# Patient Record
Sex: FEMALE | Race: WHITE | NOT HISPANIC OR LATINO | Employment: UNEMPLOYED | URBAN - METROPOLITAN AREA
[De-identification: names, ages, dates, MRNs, and addresses within clinical notes are randomized per-mention and may not be internally consistent; named-entity substitution may affect disease eponyms.]

---

## 2017-08-15 PROBLEM — T85.44XA BREAST IMPLANT CAPSULAR CONTRACTURE: Status: ACTIVE | Noted: 2017-08-15

## 2017-09-13 PROBLEM — K42.9 UMBILICAL HERNIA WITHOUT OBSTRUCTION AND WITHOUT GANGRENE: Status: ACTIVE | Noted: 2017-09-13

## 2020-11-09 PROBLEM — Z80.3 FAMILY HISTORY OF MALIGNANT NEOPLASM OF BREAST: Status: ACTIVE | Noted: 2020-11-09

## 2020-11-19 ENCOUNTER — TELEPHONE (OUTPATIENT)
Dept: OBSTETRICS AND GYNECOLOGY | Facility: CLINIC | Age: 50
End: 2020-11-19

## 2020-11-19 NOTE — TELEPHONE ENCOUNTER
----- Message from Kassie Myrick sent at 11/19/2020  9:41 AM CST -----  Regarding: patient advice  Contact: patient  Patient would like to know if all of her records was received from her previous doctor prior to her appointment on 12/1/20. Please call back at [phone].

## 2020-11-24 ENCOUNTER — TELEPHONE (OUTPATIENT)
Dept: OBSTETRICS AND GYNECOLOGY | Facility: CLINIC | Age: 50
End: 2020-11-24

## 2020-11-24 NOTE — TELEPHONE ENCOUNTER
----- Message from Shona Huynh sent at 11/24/2020  2:09 PM CST -----  Regarding: pt advice  Contact: Pt  Pt is calling to see if her med rec was received from her previous provider. Please call back at [phone]//thank you acc

## 2020-11-30 ENCOUNTER — TELEPHONE (OUTPATIENT)
Dept: OBSTETRICS AND GYNECOLOGY | Facility: CLINIC | Age: 50
End: 2020-11-30

## 2020-11-30 NOTE — TELEPHONE ENCOUNTER
----- Message from Yulia Perez sent at 11/30/2020  3:18 PM CST -----  Pt would like to know if previous medical records have been received.  Please call back at [phone].xMd

## 2021-02-17 ENCOUNTER — TELEPHONE (OUTPATIENT)
Dept: OBSTETRICS AND GYNECOLOGY | Facility: CLINIC | Age: 51
End: 2021-02-17

## 2021-04-14 ENCOUNTER — OFFICE VISIT (OUTPATIENT)
Dept: OBSTETRICS AND GYNECOLOGY | Facility: CLINIC | Age: 51
End: 2021-04-14
Payer: COMMERCIAL

## 2021-04-14 VITALS
HEIGHT: 64 IN | WEIGHT: 144 LBS | SYSTOLIC BLOOD PRESSURE: 134 MMHG | DIASTOLIC BLOOD PRESSURE: 90 MMHG | BODY MASS INDEX: 24.59 KG/M2

## 2021-04-14 DIAGNOSIS — Z01.419 WELL WOMAN EXAM WITH ROUTINE GYNECOLOGICAL EXAM: Primary | ICD-10-CM

## 2021-04-14 DIAGNOSIS — Z12.11 SCREENING FOR COLON CANCER: ICD-10-CM

## 2021-04-14 DIAGNOSIS — Z13.820 SCREENING FOR OSTEOPOROSIS: ICD-10-CM

## 2021-04-14 DIAGNOSIS — N95.1 MENOPAUSAL SYMPTOMS: ICD-10-CM

## 2021-04-14 PROCEDURE — 3008F PR BODY MASS INDEX (BMI) DOCUMENTED: ICD-10-PCS | Mod: CPTII,S$GLB,, | Performed by: OBSTETRICS & GYNECOLOGY

## 2021-04-14 PROCEDURE — 99386 PREV VISIT NEW AGE 40-64: CPT | Mod: S$GLB,,, | Performed by: OBSTETRICS & GYNECOLOGY

## 2021-04-14 PROCEDURE — 3008F BODY MASS INDEX DOCD: CPT | Mod: CPTII,S$GLB,, | Performed by: OBSTETRICS & GYNECOLOGY

## 2021-04-14 PROCEDURE — 99386 PR PREVENTIVE VISIT,NEW,40-64: ICD-10-PCS | Mod: S$GLB,,, | Performed by: OBSTETRICS & GYNECOLOGY

## 2021-05-12 ENCOUNTER — PATIENT MESSAGE (OUTPATIENT)
Dept: RESEARCH | Facility: HOSPITAL | Age: 51
End: 2021-05-12

## 2021-10-15 ENCOUNTER — TELEPHONE (OUTPATIENT)
Dept: OBSTETRICS AND GYNECOLOGY | Facility: CLINIC | Age: 51
End: 2021-10-15

## 2021-10-15 DIAGNOSIS — Z00.00 LABORATORY EXAM ORDERED AS PART OF ROUTINE GENERAL MEDICAL EXAMINATION: Primary | ICD-10-CM

## 2021-10-15 LAB
CHOLEST SERPL-MSCNC: 259 MG/DL (ref 100–200)
E2: <12.2 PG/ML
FREE TESTOSTERONE: 0.04 NG/DL (ref 0–1)
FSH: 89 MIU/ML
HDLC SERPL-MCNC: 88 MG/DL
LDL/HDL RATIO: 1.7 (ref 1–3)
LDLC SERPL CALC-MCNC: 149 MG/DL (ref 0–100)
LH: 37.9 MIU/ML
PROGEST SERPL-MCNC: <0.2 NG/ML
TRIGL SERPL-MCNC: 110 MG/DL (ref 0–150)

## 2021-10-18 ENCOUNTER — TELEPHONE (OUTPATIENT)
Dept: OBSTETRICS AND GYNECOLOGY | Facility: CLINIC | Age: 51
End: 2021-10-18

## 2021-10-18 DIAGNOSIS — N95.2 VAGINAL ATROPHY: Primary | ICD-10-CM

## 2021-10-18 RX ORDER — ESTRADIOL 0.1 MG/G
1 CREAM VAGINAL
Qty: 42.5 G | Refills: 2 | Status: SHIPPED | OUTPATIENT
Start: 2021-10-18 | End: 2022-05-03

## 2021-12-03 ENCOUNTER — TELEPHONE (OUTPATIENT)
Dept: OBSTETRICS AND GYNECOLOGY | Facility: CLINIC | Age: 51
End: 2021-12-03
Payer: COMMERCIAL

## 2022-01-10 ENCOUNTER — TELEPHONE (OUTPATIENT)
Dept: OBSTETRICS AND GYNECOLOGY | Facility: CLINIC | Age: 52
End: 2022-01-10
Payer: COMMERCIAL

## 2022-01-10 NOTE — TELEPHONE ENCOUNTER
Discussed with patient reason for avoiding all hormones was history of atypical lobular hyperplasia. Reason for cyclic progesterone at the time was amenorrhea and thickened endometrium. She has an appt next month with her doctor in [place] and will follow up after.

## 2022-01-10 NOTE — TELEPHONE ENCOUNTER
"C/o hot flashes.  States that her previous  Told her she "HAD to be on Progesterone".  Pt. Does not understand why previous  Told her that and you told her she does not need HRT due to breast precancer.  States you also told her about Bon[place]ide but is still unhappy.  "

## 2022-04-19 ENCOUNTER — TELEPHONE (OUTPATIENT)
Dept: OBSTETRICS AND GYNECOLOGY | Facility: CLINIC | Age: 52
End: 2022-04-19
Payer: COMMERCIAL

## 2022-04-19 NOTE — TELEPHONE ENCOUNTER
Please notify pt that I agree with her proceeding with salivary testing at [facility]. They will usually then make a recommendation for dosing of their estrogen/progesterone cream after those results are reviewed.

## 2022-04-19 NOTE — TELEPHONE ENCOUNTER
----- Message from Jeevan Pittman LPN sent at 4/19/2022 12:17 PM CDT -----  Contact: self    ----- Message -----  From: Shelbi Rosenthal  Sent: 4/19/2022  12:05 PM CDT  To: [facility] Staff    She spoke with her Oncologist to get recommended on what she should use based on her history. They recommended a compound bio identical cream, and to do a saliva test. [facility] does it but wanted to check with your office first and see what her options were or what your opinions are. Please call back at [phone].

## 2022-04-20 ENCOUNTER — TELEPHONE (OUTPATIENT)
Dept: OBSTETRICS AND GYNECOLOGY | Facility: CLINIC | Age: 52
End: 2022-04-20
Payer: COMMERCIAL

## 2022-05-03 ENCOUNTER — OFFICE VISIT (OUTPATIENT)
Dept: OBSTETRICS AND GYNECOLOGY | Facility: CLINIC | Age: 52
End: 2022-05-03
Payer: COMMERCIAL

## 2022-05-03 VITALS
WEIGHT: 142.63 LBS | DIASTOLIC BLOOD PRESSURE: 87 MMHG | SYSTOLIC BLOOD PRESSURE: 124 MMHG | BODY MASS INDEX: 24.48 KG/M2

## 2022-05-03 DIAGNOSIS — N95.1 MENOPAUSAL SYMPTOMS: ICD-10-CM

## 2022-05-03 DIAGNOSIS — Z01.419 WELL WOMAN EXAM WITH ROUTINE GYNECOLOGICAL EXAM: Primary | ICD-10-CM

## 2022-05-03 DIAGNOSIS — N30.90 CYSTITIS: ICD-10-CM

## 2022-05-03 DIAGNOSIS — Z13.820 SCREENING FOR OSTEOPOROSIS: ICD-10-CM

## 2022-05-03 DIAGNOSIS — Z00.00 LABORATORY EXAM ORDERED AS PART OF ROUTINE GENERAL MEDICAL EXAMINATION: ICD-10-CM

## 2022-05-03 PROCEDURE — 81002 URINALYSIS NONAUTO W/O SCOPE: CPT | Mod: S$GLB,,, | Performed by: OBSTETRICS & GYNECOLOGY

## 2022-05-03 PROCEDURE — 99396 PR PREVENTIVE VISIT,EST,40-64: ICD-10-PCS | Mod: S$GLB,,, | Performed by: OBSTETRICS & GYNECOLOGY

## 2022-05-03 PROCEDURE — 99396 PREV VISIT EST AGE 40-64: CPT | Mod: S$GLB,,, | Performed by: OBSTETRICS & GYNECOLOGY

## 2022-05-03 PROCEDURE — 81002 PR URINALYSIS NONAUTO W/O SCOPE: ICD-10-PCS | Mod: S$GLB,,, | Performed by: OBSTETRICS & GYNECOLOGY

## 2022-05-03 NOTE — PROGRESS NOTES
Alivia Simental is a 51 y.o. female  who presents for a well woman exam.  She has no issues, problems, or complaints. She had salivary testing and is waiting for results. More hot flashes and irritability along with brain fog. No periods in over a year now. She just finished 10 d course of Levaquin for UTI.    Past Medical History:   Diagnosis Date    Atypical lobular hyperplasia of breast 2015    Breast lump     HPV (human papilloma virus) infection        Past Surgical History:   Procedure Laterality Date    BREAST BIOPSY Right 2015    BREAST RECONSTRUCTION Bilateral 2017    with silicone implants     SECTION      COLONOSCOPY  2021    LOOP ELECTROSURGICAL EXCISION PROCEDURE (LEEP)      MASTECTOMY Bilateral 2015    OPEN REDUCTION NASAL FRACTURE      THYROID CYST EXCISION      UMBILICAL HERNIA REPAIR  10/17/2017    WISDOM TOOTH EXTRACTION         OB History    Para Term  AB Living   2 2       2   SAB IAB Ectopic Multiple Live Births                  # Outcome Date GA Lbr Noel/2nd Weight Sex Delivery Anes PTL Lv   2 Para            1 Para                Family History   Problem Relation Age of Onset    Hypertension Mother     Hyperlipidemia Mother     Sinusitis Mother     Arthritis Mother     Lung disease Father     COPD Father     Breast cancer Paternal Grandmother 40    Breast cancer Maternal Cousin 40       Social History     Tobacco Use    Smoking status: Never Smoker    Smokeless tobacco: Never Used   Substance Use Topics    Alcohol use: No    Drug use: No         Current Outpatient Medications:     multivitamin capsule, Take 1 capsule by mouth once daily., Disp: , Rfl:      Review of patient's allergies indicates:  No Known Allergies     ROS:  GENERAL: Denies weight gain or weight loss. Feeling well overall.   SKIN: Denies rash or lesions.   HEAD: Denies head injury or headache.   NODES: Denies enlarged lymph nodes.   CHEST: Denies  shortness of breath.   CARDIOVASCULAR: Denies palpitations or chest pain.   ABDOMEN: Denies abdominal pain, constipation, diarrhea, nausea, vomiting or rectal bleeding.   URINARY: Denies frequency, dysuria, hematuria, or burning on urination.  REPRODUCTIVE: See HPI.   BREASTS: Denies pain, lumps, or nipple discharge.   HEMATOLOGIC: Denies easy bruisability or excessive bleeding.  MUSCULOSKELETAL: Denies joint pain or swelling.   NEUROLOGIC: Denies syncope or weakness.   PSYCHIATRIC: Denies depression, anxiety or mood swings.    PHYSICAL EXAM:    /87   Wt 64.7 kg (142 lb 9.6 oz)   LMP 09/15/2017   BMI 24.48 kg/m²    Body mass index is 24.48 kg/m².     APPEARANCE: Well nourished, well developed, in no acute distress.  AFFECT: WNL, alert and oriented x 3  SKIN: No acne or hirsutism  NECK: Neck symmetric without masses or thyromegaly  NODES: No inguinal, cervical, axillary, or femoral lymph node enlargement  CHEST: Good respiratory effect  ABDOMEN: Soft.  No tenderness or masses.  No hepatosplenomegaly.  No hernias.  BREASTS: Symmetrical, no skin changes or visible lesions.  No palpable masses, nipple discharge bilaterally.  PELVIC: Normal external genitalia without lesions.  Normal hair distribution.  Adequate perineal body, normal urethral meatus.  Urethra and bladder without tenderness or masses. Vagina moist and well rugated without lesions or discharge.  Cervix pink, without lesions, discharge or tenderness.  No significant cystocele or rectocele.  Bimanual exam shows uterus to be normal size, regular, mobile and nontender.  Adnexa without masses or tenderness.    EXTREMITIES: No edema.       UA: negative        Well woman exam with routine gynecological exam    Screening for osteoporosis  -     DXA Bone Density Spine And Hip; Future    Cystitis  -     POCT Urinalysis, Dipstick, Automated, W/O Scope    Laboratory exam ordered as part of routine general medical examination  -     CBC Auto Differential;  Future; Expected date: 05/10/2022  -     Comprehensive Metabolic Panel; Future; Expected date: 05/10/2022  -     Lipid Panel; Future; Expected date: 05/10/2022  -     TSH; Future; Expected date: 05/10/2022    Menopausal symptoms  -     Estradiol; Future; Expected date: 05/10/2022  -     Testosterone, Free; Future; Expected date: 05/10/2022  -     Progesterone; Future; Expected date: 05/10/2022       Pap/HPV negative 4/14/21  Patient was counseled today on A.C.S. Pap guidelines and recommendations for yearly pelvic exams, mammograms and monthly self breast exams; to see her PCP for other health maintenance.     Follow up in 1 year

## 2022-05-05 ENCOUNTER — TELEPHONE (OUTPATIENT)
Dept: OBSTETRICS AND GYNECOLOGY | Facility: CLINIC | Age: 52
End: 2022-05-05
Payer: COMMERCIAL

## 2022-05-05 NOTE — TELEPHONE ENCOUNTER
----- Message from Shelbi Rosenthal sent at 5/5/2022  4:17 PM CDT -----  Contact: self  Type:  Patient Returning Call    Who Called: Alivia Simental   Who Left Message for Patient: Jeevan  Does the patient know what this is regarding?: She was in this week, she's not sure  Would the patient rather a call back or a response via [facility]?  Call back   Best Call Back Number: [phone]   Additional Information: n/a

## 2022-05-06 LAB
ABS NRBC COUNT: 0 X 10 3/UL (ref 0–0.01)
ABSOLUTE BASOPHIL: 0.05 X 10 3/UL (ref 0–0.22)
ABSOLUTE EOSINOPHIL: 0.13 X 10 3/UL (ref 0.04–0.54)
ABSOLUTE IMMATURE GRAN: 0.01 X 10 3/UL (ref 0–0.04)
ABSOLUTE LYMPHOCYTE: 2.29 X 10 3/UL (ref 0.86–4.75)
ABSOLUTE MONOCYTE: 0.39 X 10 3/UL (ref 0.22–1.08)
ALBUMIN SERPL-MCNC: 4.8 G/DL (ref 3.5–5.2)
ALBUMIN/GLOB SERPL ELPH: 2 {RATIO} (ref 1–2.7)
ALP ISOS SERPL LEV INH-CCNC: 68 U/L (ref 35–105)
ALT (SGPT): 12 U/L (ref 0–33)
ANION GAP SERPL CALC-SCNC: 11 MMOL/L (ref 8–17)
AST SERPL-CCNC: 16 U/L (ref 0–32)
BASOPHILS NFR BLD: 0.9 % (ref 0.2–1.2)
BILIRUBIN, TOTAL: 0.44 MG/DL (ref 0–1.2)
BUN/CREAT SERPL: 25 (ref 6–20)
CALCIUM SERPL-MCNC: 9.8 MG/DL (ref 8.6–10.2)
CARBON DIOXIDE, CO2: 28 MMOL/L (ref 22–29)
CHLORIDE: 103 MMOL/L (ref 98–107)
CHOLEST SERPL-MSCNC: 272 MG/DL (ref 100–200)
CREAT SERPL-MCNC: 0.58 MG/DL (ref 0.5–0.9)
E2: <12.2 PG/ML
EOSINOPHIL NFR BLD: 2.5 % (ref 0.7–7)
FREE TESTOSTERONE: 0.12 NG/DL (ref 0–1)
GFR ESTIMATION: 109.17
GLOBULIN: 2.4 G/DL (ref 1.5–4.5)
GLUCOSE: 93 MG/DL (ref 74–106)
HCT VFR BLD AUTO: 41.2 % (ref 37–47)
HDLC SERPL-MCNC: 95 MG/DL
HGB BLD-MCNC: 13.4 G/DL (ref 12–16)
IMMATURE GRANULOCYTES: 0.2 % (ref 0–0.5)
LDL/HDL RATIO: 1.7 (ref 1–3)
LDLC SERPL CALC-MCNC: 162.6 MG/DL (ref 0–100)
LYMPHOCYTES NFR BLD: 43.3 % (ref 19.3–53.1)
MCH RBC QN AUTO: 29.5 PG (ref 27–32)
MCHC RBC AUTO-ENTMCNC: 32.5 G/DL (ref 32–36)
MCV RBC AUTO: 90.7 FL (ref 82–100)
MONOCYTES NFR BLD: 7.4 % (ref 4.7–12.5)
NEUTROPHILS # BLD AUTO: 2.42 X 10 3/UL (ref 2.15–7.56)
NEUTROPHILS NFR BLD: 45.7 % (ref 34–71.1)
NUCLEATED RED BLOOD CELLS: 0 /100 WBC (ref 0–0.2)
PLATELET # BLD AUTO: 230 X 10 3/UL (ref 135–400)
POTASSIUM: 4.1 MMOL/L (ref 3.5–5.1)
PROGEST SERPL-MCNC: 0.25 NG/ML
PROT SNV-MCNC: 7.2 G/DL (ref 6.4–8.3)
RBC # BLD AUTO: 4.54 X 10 6/UL (ref 4.2–5.4)
RDW-SD: 42.9 FL (ref 37–54)
SODIUM: 142 MMOL/L (ref 136–145)
TRIGL SERPL-MCNC: 72 MG/DL (ref 0–150)
TSH SERPL DL<=0.005 MIU/L-ACNC: 1.48 UIU/ML (ref 0.27–4.2)
UREA NITROGEN (BUN): 14.5 MG/DL (ref 6–20)
WBC # BLD: 5.29 X 10 3/UL (ref 4.3–10.8)

## 2022-05-12 ENCOUNTER — TELEPHONE (OUTPATIENT)
Dept: OBSTETRICS AND GYNECOLOGY | Facility: CLINIC | Age: 52
End: 2022-05-12
Payer: COMMERCIAL

## 2022-05-12 NOTE — TELEPHONE ENCOUNTER
----- Message from Marline Grissom sent at 5/12/2022  3:58 PM CDT -----  Contact: PT      Name of Caller: Alivia   Pharmacy Name:  [facility]             Prescription Name:  compound  Rx approval   What do they need to clarify?: status of PA    Best Call Back Number: [phone] (home)        Additional Information:  PA

## 2022-05-12 NOTE — TELEPHONE ENCOUNTER
----- Message from Jesi Orosco sent at 5/12/2022  2:02 PM CDT -----  pt states that compounding estrogen cream needs an authorization, please try to do today as she'll be out of town tomorrow..[phone]

## 2022-05-13 ENCOUNTER — TELEPHONE (OUTPATIENT)
Dept: OBSTETRICS AND GYNECOLOGY | Facility: CLINIC | Age: 52
End: 2022-05-13
Payer: COMMERCIAL

## 2022-05-13 NOTE — TELEPHONE ENCOUNTER
----- Message from Júnior Meza sent at 5/13/2022 11:57 AM CDT -----  Contact: [facility]  Please call [facility] at [phone] regarding patient script.

## 2022-05-13 NOTE — TELEPHONE ENCOUNTER
----- Message from Yun Palencia sent at 5/13/2022  9:57 AM CDT -----  Patient need to speak to nurse regarding faxing her prescription to the pharmacy . Call back number [phone]. Rayos

## 2022-06-13 ENCOUNTER — TELEPHONE (OUTPATIENT)
Dept: OBSTETRICS AND GYNECOLOGY | Facility: CLINIC | Age: 52
End: 2022-06-13
Payer: COMMERCIAL

## 2022-06-13 ENCOUNTER — PATIENT MESSAGE (OUTPATIENT)
Dept: OBSTETRICS AND GYNECOLOGY | Facility: CLINIC | Age: 52
End: 2022-06-13
Payer: COMMERCIAL

## 2022-06-13 NOTE — TELEPHONE ENCOUNTER
[facility] is calling saying that patient wants to go up on her BiEst from .1mg to .2mg. She also wants to go up on her Progesterone from 20mg to 30mg. Do you approve of this?

## 2022-06-14 ENCOUNTER — TELEPHONE (OUTPATIENT)
Dept: OBSTETRICS AND GYNECOLOGY | Facility: CLINIC | Age: 52
End: 2022-06-14
Payer: COMMERCIAL

## 2022-06-14 NOTE — TELEPHONE ENCOUNTER
----- Message from Heaven Harris sent at 6/14/2022 11:16 AM CDT -----  Type:  Patient Returning Call    Who Called: Alivia Simental    Who Left Message for Patient: Magalys   Does the patient know what this is regarding?:-  Would the patient rather a call back or a response via [facility]?    Best Call Back Number: [phone]      Additional Information: Returning your call

## 2022-07-15 ENCOUNTER — TELEPHONE (OUTPATIENT)
Dept: OBSTETRICS AND GYNECOLOGY | Facility: CLINIC | Age: 52
End: 2022-07-15
Payer: COMMERCIAL

## 2022-07-15 NOTE — TELEPHONE ENCOUNTER
janet is needing a letter of medical necessity for her estrogen/progesterone bioidentical cream that she gets at [facility]. She has to have this medication due to having a double masectomy. She will call back with email address to send to insurance company.

## 2022-07-15 NOTE — TELEPHONE ENCOUNTER
----- Message from Shona Huynh sent at 7/15/2022 11:49 AM CDT -----  Regarding: pt advice  Contact: Pt  Pt states that per her ins an appeal would need to be sent stating that the bio identical hormone therapy cream stating that it was a medical necessity. Please call back at [phone]//thank you acc

## 2022-07-18 ENCOUNTER — TELEPHONE (OUTPATIENT)
Dept: OBSTETRICS AND GYNECOLOGY | Facility: CLINIC | Age: 52
End: 2022-07-18
Payer: COMMERCIAL

## 2022-07-18 NOTE — TELEPHONE ENCOUNTER
----- Message from Aaliyah Acosta sent at 7/15/2022 12:50 PM CDT -----  Contact: Patient  Patient called to consult with nurse or staff regarding a doctors letter. She would like a call back and can be reached at [phone]. Thanks/

## 2022-08-02 ENCOUNTER — TELEPHONE (OUTPATIENT)
Dept: OBSTETRICS AND GYNECOLOGY | Facility: CLINIC | Age: 52
End: 2022-08-02
Payer: COMMERCIAL

## 2022-08-02 NOTE — TELEPHONE ENCOUNTER
----- Message from Yun Palencia sent at 8/2/2022 10:54 AM CDT -----  Patient need to speak with nurse regarding an appeal for her medication, hormone cream.  Call back number [phone]. Tks

## 2022-09-06 ENCOUNTER — PATIENT MESSAGE (OUTPATIENT)
Dept: OBSTETRICS AND GYNECOLOGY | Facility: CLINIC | Age: 52
End: 2022-09-06
Payer: COMMERCIAL

## 2022-12-09 ENCOUNTER — TELEPHONE (OUTPATIENT)
Dept: OBSTETRICS AND GYNECOLOGY | Facility: CLINIC | Age: 52
End: 2022-12-09
Payer: COMMERCIAL

## 2023-06-13 ENCOUNTER — OFFICE VISIT (OUTPATIENT)
Dept: OBSTETRICS AND GYNECOLOGY | Facility: CLINIC | Age: 53
End: 2023-06-13
Payer: COMMERCIAL

## 2023-06-13 VITALS — WEIGHT: 143 LBS | BODY MASS INDEX: 24.55 KG/M2 | SYSTOLIC BLOOD PRESSURE: 115 MMHG | DIASTOLIC BLOOD PRESSURE: 79 MMHG

## 2023-06-13 DIAGNOSIS — Z01.419 ENCOUNTER FOR WELL WOMAN EXAM WITH ROUTINE GYNECOLOGICAL EXAM: Primary | ICD-10-CM

## 2023-06-13 DIAGNOSIS — Z13.820 ENCOUNTER FOR OSTEOPOROSIS SCREENING IN ASYMPTOMATIC POSTMENOPAUSAL PATIENT: ICD-10-CM

## 2023-06-13 DIAGNOSIS — Z78.0 ENCOUNTER FOR OSTEOPOROSIS SCREENING IN ASYMPTOMATIC POSTMENOPAUSAL PATIENT: ICD-10-CM

## 2023-06-13 DIAGNOSIS — Z79.890 HORMONE REPLACEMENT THERAPY (HRT): ICD-10-CM

## 2023-06-13 PROCEDURE — 1159F PR MEDICATION LIST DOCUMENTED IN MEDICAL RECORD: ICD-10-PCS | Mod: CPTII,S$GLB,, | Performed by: OBSTETRICS & GYNECOLOGY

## 2023-06-13 PROCEDURE — 3074F SYST BP LT 130 MM HG: CPT | Mod: CPTII,S$GLB,, | Performed by: OBSTETRICS & GYNECOLOGY

## 2023-06-13 PROCEDURE — 99396 PREV VISIT EST AGE 40-64: CPT | Mod: S$GLB,,, | Performed by: OBSTETRICS & GYNECOLOGY

## 2023-06-13 PROCEDURE — 99396 PR PREVENTIVE VISIT,EST,40-64: ICD-10-PCS | Mod: S$GLB,,, | Performed by: OBSTETRICS & GYNECOLOGY

## 2023-06-13 PROCEDURE — 3074F PR MOST RECENT SYSTOLIC BLOOD PRESSURE < 130 MM HG: ICD-10-PCS | Mod: CPTII,S$GLB,, | Performed by: OBSTETRICS & GYNECOLOGY

## 2023-06-13 PROCEDURE — 3078F PR MOST RECENT DIASTOLIC BLOOD PRESSURE < 80 MM HG: ICD-10-PCS | Mod: CPTII,S$GLB,, | Performed by: OBSTETRICS & GYNECOLOGY

## 2023-06-13 PROCEDURE — 3078F DIAST BP <80 MM HG: CPT | Mod: CPTII,S$GLB,, | Performed by: OBSTETRICS & GYNECOLOGY

## 2023-06-13 PROCEDURE — 3008F BODY MASS INDEX DOCD: CPT | Mod: CPTII,S$GLB,, | Performed by: OBSTETRICS & GYNECOLOGY

## 2023-06-13 PROCEDURE — 1159F MED LIST DOCD IN RCRD: CPT | Mod: CPTII,S$GLB,, | Performed by: OBSTETRICS & GYNECOLOGY

## 2023-06-13 PROCEDURE — 3008F PR BODY MASS INDEX (BMI) DOCUMENTED: ICD-10-PCS | Mod: CPTII,S$GLB,, | Performed by: OBSTETRICS & GYNECOLOGY

## 2023-06-13 RX ORDER — KETOCONAZOLE 20 MG/ML
SHAMPOO, SUSPENSION TOPICAL
COMMUNITY
Start: 2023-06-09

## 2023-06-13 NOTE — PROGRESS NOTES
CC:  WELL WOMAN (menopausal since )  Patient Care Team:  Juan Zuluaga MD as PCP - General (Family Medicine)  Viviane Wilhelm MD (Gastroenterology)    NEW PATIENT       HPI:  Patient is a 52 y.o. who presents for her well woman exam today.  History reviewed with patient.   Patient is without complaints or concerns today. Would like to get lab orders today and wants to check est/prog but feels like this dose is doing well for her and no problems. Sees her breast doctor in [place] q yr but since her implants are recalled, they recommended seeing them q 6 months    HRT:  CONTRAINDICATED  HX ABNL PAPS: normal after LEEP-     REVIEW OF PRIOR DATA/ HEALTH MAINTENANCE:  LAST ANNUAL:   May 3 2022    LAST MMG-has double mastectomy   LAST LABS- > 1 yr and wants orders (advised to also review results w/ pcp)- will do with pcp next yr   LAST COLONOSCOPY- - by Dr. Wilhelm - q 10 yrs (neg)     Past Medical History:   Diagnosis Date    Atypical lobular hyperplasia of breast 2015    History of abnormal cervical Pap smear     HPV (human papilloma virus) infection      SURGICAL HX:   has a past surgical history that includes Breast biopsy (Right, 2015);  section (); Thyroid cyst excision (); Open reduction nasal fracture; [place] tooth extraction; Breast reconstruction (Bilateral, 2017); Mastectomy (Bilateral, 2015); Umbilical hernia repair (10/17/2017); Loop electrosurgical excision procedure (LEEP); and Colonoscopy (2021).    SOCIAL HX:    reports that she has never smoked. She has never used smokeless tobacco. She reports that she does not drink alcohol and does not use drugs.    FAMILY HX:   family history includes Arthritis in her mother; Breast cancer (age of onset: 40) in her maternal cousin and paternal grandmother; COPD in her father; Hyperlipidemia in her mother; Hypertension in her mother; Lung disease in her father; Sinusitis in her mother. .    ALLERGIES:  Patient has no  known allergies.    Current Outpatient Medications   Medication Instructions    ketoconazole (NIZORAL) 2 % shampoo No dose, route, or frequency recorded.    multivitamin capsule 1 capsule, Oral, Daily    NON FORMULARY MEDICATION BiEst (50/50) + progesterone 50mg- 4 clicks qd (boudreauxs)     ROS:  CONST:  No fever, chills, fatigue or unexpected changes in weight   CV: No chest pain or palpitations   RESP:  No shortness of breath or cough   GI: No abd pain, vomiting, diarrhea, blood in stool, or changes in bowel mvmts   SKIN: No rashes or lesions  MUSCULOSKELETAL: No joint swelling or pain   PSYCH: No changes in mood or insomia   BREASTS: No asymmetry, lumps, pain, nipple discharge, or skin changes   :  No dysuria, urgency, frequency, hematuria or incontinence           No vag dc, itching, odor or dryness           No pelvic pain, dyspareunia, or abnormal vaginal bleeding     VITALS:  Blood pressure 115/79, weight 64.9 kg (143 lb), last menstrual period 09/15/2017.  Body mass index is 24.55 kg/m².     PHYSICAL EXAM-  APPEARANCE: Well appearing, in no acute distress.   NECK: Neck symmetric   CV:  Normal rate   PULM: Normal resp rate, no resp distress, normal resp effort   PSYCH:  Normal mood and affect, cooperative   SKIN: No rashes, lesions, or abnormal bruising   LYMPH: No inguinal or axillary adenopathy   ABD: Soft, without tenderness or masses.   BREAST: Symmetrical, no nipple changes, no skin changes, No palpable masses   PELVIC:  VULVA: Normal female genitalia. No lesions.   URETHRAL MEATUS: No masses, no significant prolapse.   BLADDER/ URETHRA: No masses or suprapubic tenderness   VAGINA: No lesions. +atrophic changes. No discharge   CVX: No lesions   UTERUS: Normal size/shape, mobile, non-tender   ADNEXA: No masses, tenderness, or fullness   ANUS/ PERINEUM: Normal tone.  No lesions.     *female chaperone present for entire exam    ASSESSMENT and PLAN:  Encounter for well woman exam with routine  gynecological exam  -     Liquid-based pap smear, screening  -     CBC Auto Differential; Future  -     Comprehensive Metabolic Panel; Future  -     T4, Free; Future  -     TSH; Future  -     Lipid Panel; Future    Hormone replacement therapy (HRT)  -     Progesterone; Future; Expected date: 06/13/2023  -     Estradiol; Future    Encounter for osteoporosis screening in asymptomatic postmenopausal patient  -     DXA Bone Density Axial Skeleton 1 or more sites; Future; Expected date: 06/27/2023       FOLLOWUP:  1 year for wwe or sooner prn    COUNSELING:  Patient was counseled today on recommendation for yearly pelvic exam, current Pap guidelines, self breast exams, annual screening mammograms, routine screening colonoscopy , and bone density testing.  Discussed vitamin D and calcium supplements as well as weight bearing exercise to decrease risks. Encouraged patient to see her PCP for other health maintenance.

## 2023-06-14 ENCOUNTER — PATIENT MESSAGE (OUTPATIENT)
Dept: OBSTETRICS AND GYNECOLOGY | Facility: CLINIC | Age: 53
End: 2023-06-14
Payer: COMMERCIAL

## 2023-06-14 NOTE — PROGRESS NOTES
Please let pt know her cholesterol is elevated so she will want to review that with Dr Zuluaga.           As for hormones, she is on compounded Biest/ progesterone and is trying to stay on her hrt without raising these levels ( since she has hx of breast atypical hyperplasia/ 2x mastectomy).  Her estrogen is undetectable which is good. Her progesterone  is higher than it has been in the past (oct 2021- undetectable, may 2022 it was .252 and now it is .569).         There is no good data or guidelines to guide us but when levels are undetectable, we can assume her exposure to hormones in very minimal and less harmful. However, this progesterone going up tells us she is getting some exposure to progesterone- the problem is we dont know at what level would be considered safe. We cant just decrease her progesterone or stop it because she still has a uterus and needs it to protect her uterus while on estrogen.  So, her options are:  1) discuss with her breast dr and see if she has more patients with this issue and see if is comfortable at any level  2) stop the cream completely, which is the safest  3) cut back on the amount of cream she is using and try to use as little as possible and only as often as needed and once she stabilizes with how little she can tolerate, we can give it a couple weeks and recheck dose and see if we can get that progesterone level lower or undetectable.     Please send her this note as well thru portal in case she has any questions

## 2023-06-15 ENCOUNTER — TELEPHONE (OUTPATIENT)
Dept: OBSTETRICS AND GYNECOLOGY | Facility: CLINIC | Age: 53
End: 2023-06-15
Payer: COMMERCIAL

## 2023-06-15 NOTE — TELEPHONE ENCOUNTER
Spoke with patient. Two pt identifiers confirmed. Notified patient of her lab results. Patient verbalized understanding. Patient able to view the result note and lab results.

## 2023-06-15 NOTE — TELEPHONE ENCOUNTER
----- Message from Becky Gallardo MD sent at 6/14/2023 12:29 PM CDT -----  Please let pt know her cholesterol is elevated so she will want to review that with Dr Zuluaga.           As for hormones, she is on compounded Biest/ progesterone and is trying to stay on her hrt without raising these levels ( since she has hx of breast atypical hyperplasia/ 2x mastectomy).  Her estrogen is undetectable which is good. Her progesterone  is higher than it has been in the past (oct 2021- undetectable, may 2022 it was .252 and now it is .569).         There is no good data or guidelines to guide us but when levels are undetectable, we can assume her exposure to hormones in very minimal and less harmful. However, this progesterone going up tells us she is getting some exposure to progesterone- the problem is we dont know at what level would be considered safe. We cant just decrease her progesterone or stop it because she still has a uterus and needs it to protect her uterus while on estrogen.  So, her options are:  1) discuss with her breast dr and see if she has more patients with this issue and see if is comfortable at any level  2) stop the cream completely, which is the safest  3) cut back on the amount of cream she is using and try to use as little as possible and only as often as needed and once she stabilizes with how little she can tolerate, we can give it a couple weeks and recheck dose and see if we can get that progesterone level lower or undetectable.     Please send her this note as well thru portal in case she has any questions

## 2023-06-16 LAB — Lab: NORMAL

## 2023-06-27 ENCOUNTER — PATIENT MESSAGE (OUTPATIENT)
Dept: OBSTETRICS AND GYNECOLOGY | Facility: CLINIC | Age: 53
End: 2023-06-27
Payer: COMMERCIAL

## 2023-06-28 ENCOUNTER — TELEPHONE (OUTPATIENT)
Dept: OBSTETRICS AND GYNECOLOGY | Facility: CLINIC | Age: 53
End: 2023-06-28
Payer: COMMERCIAL

## 2023-06-28 NOTE — TELEPHONE ENCOUNTER
----- Message from Shona Huynh sent at 6/28/2023  3:59 PM CDT -----  Regarding: med refill  Contact: pt  Pt is calling to check status on refill request for biest 50/50/progesterone 50mg compound. Pt states that pharm keeps telling her that a request was faxed to office. Pt uses     [facility] - [place], [place] - [address]  [address] [facility] [address] [address]  Phone: [phone] Fax: [phone]    Please call back at [phone]//thank you acc

## 2023-06-28 NOTE — TELEPHONE ENCOUNTER
----- Message from Shona Huynh sent at 6/27/2023  9:13 AM CDT -----  Regarding: med refill  Contact: pt  Type:  RX Refill Request    Who Called:  Alivia Simental   Refill or New Rx: refill   RX Name and Strength: biest 50/50 +  progesterone 50mg compound   How is the patient currently taking it? (ex. 1XDay): 2x day  Is this a 30 day or 90 day RX:30 day   Preferred Pharmacy with phone number:    [facility] - [place], [place] - [address]  [address] [address]  Phone: [phone] Fax: [phone]    Local or Mail Order: local   Ordering Provider: Candy   Would the patient rather a call back or a response via [facility]?  Call back   Best Call Back Number: [phone]  Additional Information:

## 2023-08-29 LAB
ABS NRBC COUNT: 0 X 10 3/UL (ref 0–0.01)
ABSOLUTE BASOPHIL: 0.04 X 10 3/UL (ref 0–0.22)
ABSOLUTE EOSINOPHIL: 0.12 X 10 3/UL (ref 0.04–0.54)
ABSOLUTE IMMATURE GRAN: 0.02 X 10 3/UL (ref 0–0.04)
ABSOLUTE LYMPHOCYTE: 2.28 X 10 3/UL (ref 0.86–4.75)
ABSOLUTE MONOCYTE: 0.45 X 10 3/UL (ref 0.22–1.08)
ALBUMIN SERPL-MCNC: 4.7 G/DL (ref 3.5–5.2)
ALBUMIN/GLOB SERPL ELPH: 1.9 {RATIO} (ref 1–2.7)
ALP ISOS SERPL LEV INH-CCNC: 64 U/L (ref 35–105)
ALT (SGPT): 13 U/L (ref 0–33)
ANION GAP SERPL CALC-SCNC: 11 MMOL/L (ref 8–17)
AST SERPL-CCNC: 17 U/L (ref 0–32)
BASOPHILS NFR BLD: 0.6 % (ref 0.2–1.2)
BILIRUBIN, TOTAL: 0.46 MG/DL (ref 0–1.2)
BUN/CREAT SERPL: 32 (ref 6–20)
CALCIUM SERPL-MCNC: 9.7 MG/DL (ref 8.6–10.2)
CARBON DIOXIDE, CO2: 27 MMOL/L (ref 22–29)
CHLORIDE: 106 MMOL/L (ref 98–107)
CHOLEST SERPL-MSCNC: 273 MG/DL (ref 100–200)
CREAT SERPL-MCNC: 0.55 MG/DL (ref 0.5–0.9)
E2: <12.2 PG/ML
EOSINOPHIL NFR BLD: 1.9 % (ref 0.7–7)
GFR ESTIMATION: 96.09 ML/MIN/1.73M2
GLOBULIN: 2.5 G/DL (ref 1.5–4.5)
GLUCOSE: 91 MG/DL (ref 74–106)
HCT VFR BLD AUTO: 42.9 % (ref 37–47)
HDLC SERPL-MCNC: 101 MG/DL
HGB BLD-MCNC: 14.1 G/DL (ref 12–16)
IMMATURE GRANULOCYTES: 0.3 % (ref 0–0.5)
LDL/HDL RATIO: 1.6 (ref 1–3)
LDLC SERPL CALC-MCNC: 159.4 MG/DL (ref 0–100)
LYMPHOCYTES NFR BLD: 36.4 % (ref 19.3–53.1)
MCH RBC QN AUTO: 30.2 PG (ref 27–32)
MCHC RBC AUTO-ENTMCNC: 32.9 G/DL (ref 32–36)
MCV RBC AUTO: 91.9 FL (ref 82–100)
MONOCYTES NFR BLD: 7.2 % (ref 4.7–12.5)
NEUTROPHILS # BLD AUTO: 3.35 X 10 3/UL (ref 2.15–7.56)
NEUTROPHILS NFR BLD: 53.6 % (ref 34–71.1)
NUCLEATED RED BLOOD CELLS: 0 /100 WBC (ref 0–0.2)
PLATELET # BLD AUTO: 206 X 10 3/UL (ref 135–400)
POTASSIUM: 4.6 MMOL/L (ref 3.5–5.1)
PROGEST SERPL-MCNC: 0.57 NG/ML
PROT SNV-MCNC: 7.2 G/DL (ref 6.4–8.3)
RBC # BLD AUTO: 4.67 X 10 6/UL (ref 4.2–5.4)
RDW-SD: 41.7 FL (ref 37–54)
SODIUM: 144 MMOL/L (ref 136–145)
T4, FREE: 1.28 NG/DL (ref 0.93–1.7)
TRIGL SERPL-MCNC: 63 MG/DL (ref 0–150)
TSH SERPL DL<=0.005 MIU/L-ACNC: 1.44 UIU/ML (ref 0.27–4.2)
UREA NITROGEN (BUN): 17.6 MG/DL (ref 6–20)
WBC # BLD: 6.26 X 10 3/UL (ref 4.3–10.8)

## 2024-04-22 ENCOUNTER — TELEPHONE (OUTPATIENT)
Dept: OBSTETRICS AND GYNECOLOGY | Facility: CLINIC | Age: 54
End: 2024-04-22
Payer: COMMERCIAL

## 2024-04-22 ENCOUNTER — PATIENT MESSAGE (OUTPATIENT)
Dept: OBSTETRICS AND GYNECOLOGY | Facility: CLINIC | Age: 54
End: 2024-04-22
Payer: COMMERCIAL

## 2024-04-22 NOTE — TELEPHONE ENCOUNTER
Spoke with patient. Two pt identifiers confirmed. Notified patient of her dexa scan results. Patient verbalized understanding.

## 2024-04-22 NOTE — TELEPHONE ENCOUNTER
----- Message from Becky Gallardo MD sent at 4/22/2024  8:29 AM CDT -----  Please call pt and let her know her dexa showed osteopenia. We will repeat her dexa in 2 years but make sure she is getting some weight bearing exercise as well as adequate calcium and vitamin d3 intake.(calcium=1200mg split into 2 doses a day, vitamin d3 800IU)  If she has not had her vitamin d level checked, she can ask her pcp or I can give her an order, but low vitamin d levels are common and she may need additional supplementation.

## 2024-06-18 ENCOUNTER — OFFICE VISIT (OUTPATIENT)
Dept: OBSTETRICS AND GYNECOLOGY | Facility: CLINIC | Age: 54
End: 2024-06-18
Payer: COMMERCIAL

## 2024-06-18 VITALS
WEIGHT: 142 LBS | DIASTOLIC BLOOD PRESSURE: 71 MMHG | BODY MASS INDEX: 24.24 KG/M2 | HEART RATE: 83 BPM | HEIGHT: 64 IN | SYSTOLIC BLOOD PRESSURE: 106 MMHG

## 2024-06-18 DIAGNOSIS — Z01.419 ENCOUNTER FOR WELL WOMAN EXAM WITH ROUTINE GYNECOLOGICAL EXAM: Primary | ICD-10-CM

## 2024-06-18 DIAGNOSIS — N95.2 ATROPHIC VAGINITIS: ICD-10-CM

## 2024-06-18 DIAGNOSIS — N95.1 MENOPAUSAL STATE: ICD-10-CM

## 2024-06-18 PROCEDURE — 1159F MED LIST DOCD IN RCRD: CPT | Mod: CPTII,S$GLB,, | Performed by: OBSTETRICS & GYNECOLOGY

## 2024-06-18 PROCEDURE — 3008F BODY MASS INDEX DOCD: CPT | Mod: CPTII,S$GLB,, | Performed by: OBSTETRICS & GYNECOLOGY

## 2024-06-18 PROCEDURE — 3078F DIAST BP <80 MM HG: CPT | Mod: CPTII,S$GLB,, | Performed by: OBSTETRICS & GYNECOLOGY

## 2024-06-18 PROCEDURE — 99459 PELVIC EXAMINATION: CPT | Mod: S$GLB,,, | Performed by: OBSTETRICS & GYNECOLOGY

## 2024-06-18 PROCEDURE — 3074F SYST BP LT 130 MM HG: CPT | Mod: CPTII,S$GLB,, | Performed by: OBSTETRICS & GYNECOLOGY

## 2024-06-18 PROCEDURE — 99396 PREV VISIT EST AGE 40-64: CPT | Mod: S$GLB,,, | Performed by: OBSTETRICS & GYNECOLOGY

## 2024-06-18 RX ORDER — ESTRADIOL 0.1 MG/G
1 CREAM VAGINAL
Qty: 42.5 G | Refills: 4 | Status: SHIPPED | OUTPATIENT
Start: 2024-06-20 | End: 2024-07-20

## 2024-06-18 NOTE — PROGRESS NOTES
"CC:  WELL WOMAN (menopausal since )  Patient Care Team:  Juan Zuluaga MD as PCP - General (Family Medicine)  Viviane Wilhelm MD (Gastroenterology)    Last visit with me was on  2023 for wwe    HPI:  Patient is a 53 y.o. who presents for her well woman exam today.  History reviewed with patient. Doing well on her comp hrt and ok with her breast  Wants to try some estrace vag cream tho for dryness  Patient is without complaints or concerns today.      is CONTRAINDICATED  HX ABNL PAPS: normal after LEEP-    REVIEW OF PRIOR DATA/ HEALTH MAINTENANCE:  LAST ANNUAL:   2023    LAST MMG-has double mastectomy    LAST COLONOSCOPY- - by Dr. Wilhelm - q 10 yrs (neg)   LAST DEXA- - osteopenia at [facility]     Past Medical History:   Diagnosis Date    Atypical lobular hyperplasia of breast 2015    History of abnormal cervical Pap smear     HPV (human papilloma virus) infection      SURGICAL HX:   has a past surgical history that includes Breast biopsy (Right, 2015);  section (); Thyroid cyst excision (); Open reduction nasal fracture; [place] tooth extraction; Breast reconstruction (Bilateral, 2017); Mastectomy (Bilateral, 2015); Umbilical hernia repair (10/17/2017); Loop electrosurgical excision procedure (LEEP); and Colonoscopy (2021).    SOCIAL HX:    reports that she has never smoked. She has never used smokeless tobacco. She reports that she does not drink alcohol and does not use drugs.    Current Outpatient Medications   Medication Instructions    [START ON 2024] estradioL (ESTRACE) 1 g, Vaginal, Twice weekly    ketoconazole (NIZORAL) 2 % shampoo No dose, route, or frequency recorded.    multivitamin capsule 1 capsule, Oral, Daily    NON FORMULARY MEDICATION BiEst (50/50) + progesterone 50mg- 4 clicks qd (boudreauxs)     VITALS:  Blood pressure 106/71, pulse 83, height 5' 4" (1.626 m), weight 64.4 kg (142 lb), last menstrual period 09/15/2017.  Body " mass index is 24.37 kg/m².     PHYSICAL EXAM-  APPEARANCE: Well appearing, in no acute distress.   CV/PULM: No resp distress, normal resp effort   PSYCH:  Normal mood and affect, cooperative   SKIN: No rashes, lesions, or abnormal bruising   ABD: Soft, without tenderness or masses.   BREAST: deferred/declined  PELVIC:  VULVA: Normal female genitalia. No lesions.   URETHRAL MEATUS: No masses, no significant prolapse.   BLADDER/ URETHRA: No masses or suprapubic tenderness   VAGINA/ CVX: No lesions. +atrophic changes. No discharge   UTERUS:  mobile, non-tender   ADNEXA: No masses, tenderness, or fullness   ANUS/ PERINEUM: Normal tone.  No lesions.           *patient verbally consented for exam and female chaperone present for entire exam     ASSESSMENT and PLAN:  Encounter for well woman exam with routine gynecological exam  -     Liquid-based pap smear, screening  -     CBC Auto Differential; Future  -     Comprehensive Metabolic Panel; Future  -     Lipid Panel; Future  -     TSH; Future  -     Type & Screen; Future    Menopausal state    Atrophic vaginitis  -     estradioL (ESTRACE) 0.01 % (0.1 mg/gram) vaginal cream; Place 1 g vaginally twice a week.  Dispense: 42.5 g; Refill: 4     FOLLOWUP:  1 year for wwe or sooner prn    COUNSELING:  Patient was counseled today on recommendation for yearly pelvic exam, current Pap guidelines, self breast exams, annual screening mammograms, routine screening colonoscopy , and bone density testing.  Discussed vitamin D and calcium supplements as well as weight bearing exercise to decrease risks. Encouraged patient to see her PCP for other health maintenance.

## 2024-07-08 ENCOUNTER — TELEPHONE (OUTPATIENT)
Dept: OBSTETRICS AND GYNECOLOGY | Facility: CLINIC | Age: 54
End: 2024-07-08
Payer: COMMERCIAL

## 2024-07-08 DIAGNOSIS — N95.2 ATROPHIC VAGINITIS: ICD-10-CM

## 2024-07-08 RX ORDER — ESTRADIOL 0.1 MG/G
1 CREAM VAGINAL
Qty: 8 G | Refills: 11 | Status: SHIPPED | OUTPATIENT
Start: 2024-07-08 | End: 2025-07-08

## 2024-07-08 NOTE — TELEPHONE ENCOUNTER
----- Message from Harley Boyle sent at 7/8/2024 10:23 AM CDT -----  Contact: Alivia  Good Morning   Patient called in regard to she stated that she will need this medication refilled  BiEst (50/50) + progesterone 50mg- 4 clicks qd ([facility]). Call back is [phone]

## 2024-08-14 ENCOUNTER — PATIENT MESSAGE (OUTPATIENT)
Dept: OBSTETRICS AND GYNECOLOGY | Facility: CLINIC | Age: 54
End: 2024-08-14
Payer: COMMERCIAL

## 2025-06-13 NOTE — PROGRESS NOTES
CC:  WELL WOMAN (menopausal since )  Patient Care Team:  Juan Zuluaga MD as PCP - General (Family Medicine)  Viviane Wilhelm MD (Gastroenterology)        HPI:  Patient is a 54 y.o. who presents for her well woman exam today.  History reviewed with patient. Wants lab orders so she can have them before she sees her pcp in aug. Doing well on her cream from [facility] and using her estrace vag cream 2x/wk  Patient is without complaints or concerns today.     HRT- none  HX ABNL PAPS: hx LEEP    REVIEW OF PRIOR DATA/ HEALTH MAINTENANCE:  LAST ANNUAL:   2024    LAST MMG-has double mastectomy    LAST COLONOSCOPY- - by Dr. Wilhelm - q 10 yrs (neg)   LAST DEXA- - osteopenia at [facility]     Past Medical History:   Diagnosis Date    Atypical lobular hyperplasia of breast 2015    History of abnormal cervical Pap smear     HPV (human papilloma virus) infection      SURGICAL HX:   has a past surgical history that includes Breast biopsy (Right, 2015);  section (); Thyroid cyst excision (); Open reduction nasal fracture; Mobile tooth extraction; Breast reconstruction (Bilateral, 2017); Mastectomy (Bilateral, 2015); Umbilical hernia repair (10/17/2017); Loop electrosurgical excision procedure (LEEP); and Colonoscopy (2021).    SOCIAL HX:    reports that she has never smoked. She has never used smokeless tobacco. She reports that she does not drink alcohol and does not use drugs.    Current Outpatient Medications   Medication Instructions    [START ON 2025] estradioL (ESTRACE) 1 g, Vaginal, Twice weekly    ketoconazole (NIZORAL) 2 % shampoo No dose, route, or frequency recorded.    multivitamin capsule 1 capsule, Daily     VITALS:  Blood pressure 110/78, weight 65.7 kg (144 lb 12.8 oz), last menstrual period 09/15/2017.  Body mass index is 24.85 kg/m².     PHYSICAL EXAM-  APPEARANCE: Well appearing, in no acute distress.   CV/PULM: No resp distress, normal resp effort    PSYCH:  Normal mood and affect, cooperative   SKIN: No rashes, lesions, or abnormal bruising   ABD: Soft, without tenderness or masses.   BREAST: No skin changes or nipple dc.  No palpable masses or tenderness  PELVIC:  VULVA: Normal female genitalia. No lesions.   URETHRAL MEATUS: No masses, no significant prolapse.   BLADDER/ URETHRA: No masses or suprapubic tenderness   VAGINA/ CVX: No lesions. +atrophic changes. No discharge   UTERUS:  mobile, non-tender   ADNEXA: No masses, tenderness, or fullness   ANUS/ PERINEUM: Normal tone.  No lesions.           *patient verbally consented for exam and female chaperone present for entire exam     ASSESSMENT and PLAN:  Encounter for well woman exam with routine gynecological exam  -     Liquid-based pap smear, screening  -     CBC Auto Differential; Future  -     Comprehensive Metabolic Panel; Future  -     Lipid Panel; Future  -     TSH; Future  -     T4, Free; Future  -     Calcitriol; Future    Atrophic vaginitis  -     estradioL (ESTRACE) 0.01 % (0.1 mg/gram) vaginal cream; Place 1 g vaginally twice a week.  Dispense: 42.5 g; Refill: 3       FOLLOWUP:  1 year for wwe or sooner prn    COUNSELING:  Patient was counseled today on recommendation for yearly pelvic exam, current Pap guidelines, self breast exams, annual screening mammograms, routine screening colonoscopy , and bone density testing.  Discussed vitamin D and calcium supplements as well as weight bearing exercise to decrease risks. Encouraged patient to see her PCP for other health maintenance.

## 2025-06-20 ENCOUNTER — OFFICE VISIT (OUTPATIENT)
Dept: OBSTETRICS AND GYNECOLOGY | Facility: CLINIC | Age: 55
End: 2025-06-20
Payer: COMMERCIAL

## 2025-06-20 VITALS
BODY MASS INDEX: 24.85 KG/M2 | WEIGHT: 144.81 LBS | SYSTOLIC BLOOD PRESSURE: 110 MMHG | DIASTOLIC BLOOD PRESSURE: 78 MMHG

## 2025-06-20 DIAGNOSIS — Z01.419 ENCOUNTER FOR WELL WOMAN EXAM WITH ROUTINE GYNECOLOGICAL EXAM: Primary | ICD-10-CM

## 2025-06-20 DIAGNOSIS — N95.2 ATROPHIC VAGINITIS: ICD-10-CM

## 2025-06-20 RX ORDER — ESTRADIOL 0.1 MG/G
1 CREAM VAGINAL
Qty: 42.5 G | Refills: 3 | Status: SHIPPED | OUTPATIENT
Start: 2025-06-23 | End: 2026-06-23

## 2025-06-23 ENCOUNTER — RESULTS FOLLOW-UP (OUTPATIENT)
Dept: OBSTETRICS AND GYNECOLOGY | Facility: CLINIC | Age: 55
End: 2025-06-23

## 2025-07-21 ENCOUNTER — TELEPHONE (OUTPATIENT)
Dept: OBSTETRICS AND GYNECOLOGY | Facility: CLINIC | Age: 55
End: 2025-07-21
Payer: COMMERCIAL

## 2025-07-21 NOTE — TELEPHONE ENCOUNTER
Returned call to patient. Informed her that her prescription would be called to [facility]'s today. Patient verbalized understanding.